# Patient Record
Sex: FEMALE | Race: WHITE | URBAN - METROPOLITAN AREA
[De-identification: names, ages, dates, MRNs, and addresses within clinical notes are randomized per-mention and may not be internally consistent; named-entity substitution may affect disease eponyms.]

---

## 2022-06-10 ENCOUNTER — OFFICE VISIT (OUTPATIENT)
Dept: URBAN - METROPOLITAN AREA CLINIC 56 | Facility: CLINIC | Age: 62
End: 2022-06-10
Payer: COMMERCIAL

## 2022-06-10 DIAGNOSIS — H40.013 OPEN ANGLE WITH BORDERLINE FINDINGS, LOW RISK, BILATERAL: ICD-10-CM

## 2022-06-10 DIAGNOSIS — H25.813 COMBINED FORMS OF AGE-RELATED CATARACT, BILATERAL: Primary | ICD-10-CM

## 2022-06-10 PROCEDURE — 99204 OFFICE O/P NEW MOD 45 MIN: CPT | Performed by: STUDENT IN AN ORGANIZED HEALTH CARE EDUCATION/TRAINING PROGRAM

## 2022-06-10 PROCEDURE — 92133 CPTRZD OPH DX IMG PST SGM ON: CPT | Performed by: STUDENT IN AN ORGANIZED HEALTH CARE EDUCATION/TRAINING PROGRAM

## 2022-06-10 PROCEDURE — 92134 CPTRZ OPH DX IMG PST SGM RTA: CPT | Performed by: STUDENT IN AN ORGANIZED HEALTH CARE EDUCATION/TRAINING PROGRAM

## 2022-06-10 ASSESSMENT — VISUAL ACUITY
OD: 20/20
OS: 20/25

## 2022-06-10 ASSESSMENT — INTRAOCULAR PRESSURE
OD: 14
OS: 15

## 2022-06-10 NOTE — IMPRESSION/PLAN
Impression: Combined forms of age-related cataract, bilateral: H25.813. Plan: visually significant with expected improvement in vision with phaco/IOL. Discussed r/b/a of procedure. All surgical options discussed, including LensX vs conventional, and multifocal IOLs. Patient accepts full time glasses after surgery if conventional is elected, and glasses for NEAR if LensX/toric is elected. Multifocal options also discussed as most glasses-independent option. All questions answered. Refer patient for cataract pre-op. First Eye: OD Target: Distance Dilation: Good Habitual spec wear: (-) h/o trauma (-) h/o tamsulosin

## 2022-06-10 NOTE — IMPRESSION/PLAN
Impression: Open angle with borderline findings, low risk, bilateral: H40.013. LRNFL(06/2022): full OU Plan: 2' to [facility] appearance. Good IOP OU. Baseline RNFL full OU. No treatment indicated at this time. Repeat RNFL 1 year

## 2022-09-16 ENCOUNTER — TESTING ONLY (OUTPATIENT)
Dept: URBAN - METROPOLITAN AREA CLINIC 56 | Facility: CLINIC | Age: 62
End: 2022-09-16
Payer: COMMERCIAL

## 2022-09-16 DIAGNOSIS — H25.813 COMBINED FORMS OF AGE-RELATED CATARACT, BILATERAL: Primary | ICD-10-CM

## 2022-09-16 DIAGNOSIS — Z01.818 ENCOUNTER FOR OTHER PREPROCEDURAL EXAMINATION: Primary | ICD-10-CM

## 2022-09-16 PROCEDURE — 92025 CPTRIZED CORNEAL TOPOGRAPHY: CPT | Performed by: OPHTHALMOLOGY

## 2022-09-16 PROCEDURE — 99203 OFFICE O/P NEW LOW 30 MIN: CPT | Performed by: PHYSICIAN ASSISTANT

## 2022-09-21 ENCOUNTER — PRE-OPERATIVE VISIT (OUTPATIENT)
Dept: URBAN - METROPOLITAN AREA CLINIC 44 | Facility: CLINIC | Age: 62
End: 2022-09-21
Payer: COMMERCIAL

## 2022-09-21 DIAGNOSIS — H52.223 REGULAR ASTIGMATISM, BILATERAL: ICD-10-CM

## 2022-09-21 DIAGNOSIS — H25.812 COMBINED FORMS OF AGE-RELATED CATARACT, LEFT EYE: ICD-10-CM

## 2022-09-21 DIAGNOSIS — H25.813 COMBINED FORMS OF AGE-RELATED CATARACT, BILATERAL: Primary | ICD-10-CM

## 2022-09-21 PROCEDURE — 99204 OFFICE O/P NEW MOD 45 MIN: CPT | Performed by: OPHTHALMOLOGY

## 2022-09-21 ASSESSMENT — PACHYMETRY
OD: 3.99
OD: 24.62
OS: 4.02
OS: 24.61

## 2022-09-21 NOTE — IMPRESSION/PLAN
Impression: Combined forms of age-related cataract, bilateral: H25.813. Plan: Discussed cataract diagnosis with the patient. Discussed and reviewed treatment options for cataracts. Surgical treatment is required for cataracts. Risks and benefits of surgical treatment were discussed and understood. Patient elects surgical treatment. Recommend surgery OU, OD first.  TORIC VIVITY LENS OD, VIVITY LENS OS, WITH ORA, OD AIM: PLANO,  OS AIM -0.75. PLAN LRI, REVIEWED JENNIFER AND OCT . Discussed there is no perfect lens, need for readers and possible need of glasses. Discussed limitations to vision post op due to [facility],  If first eye doing well, ok to proceed with second eye surgery. .. DEXYCU

## 2022-09-28 ENCOUNTER — SURGERY (OUTPATIENT)
Dept: URBAN - METROPOLITAN AREA SURGERY 19 | Facility: SURGERY | Age: 62
End: 2022-09-28
Payer: COMMERCIAL

## 2022-09-28 DIAGNOSIS — H52.223 REGULAR ASTIGMATISM, BILATERAL: ICD-10-CM

## 2022-09-28 DIAGNOSIS — H25.813 COMBINED FORMS OF AGE-RELATED CATARACT, BILATERAL: Primary | ICD-10-CM

## 2022-09-28 PROCEDURE — PR1FY PR1FY: CUSTOM | Performed by: OPHTHALMOLOGY

## 2022-09-28 PROCEDURE — 66984 XCAPSL CTRC RMVL W/O ECP: CPT | Performed by: OPHTHALMOLOGY

## 2022-09-29 ENCOUNTER — POST-OPERATIVE VISIT (OUTPATIENT)
Dept: URBAN - METROPOLITAN AREA CLINIC 56 | Facility: CLINIC | Age: 62
End: 2022-09-29

## 2022-09-29 DIAGNOSIS — Z48.810 ENCOUNTER FOR SURGICAL AFTERCARE FOLLOWING SURGERY ON A SENSE ORGAN: Primary | ICD-10-CM

## 2022-09-29 ASSESSMENT — INTRAOCULAR PRESSURE: OD: 18

## 2022-09-29 NOTE — IMPRESSION/PLAN
Impression: S/P Cataract Extraction by phacoemulsification with IOL placement; ORA OD - 1 Day. Encounter for surgical aftercare following surgery on a sense organ  Z48.810. Excellent post op course   Post operative instructions reviewed - Plan: good IOP. Restrictions discussed. Call with worsening pain or vision.

## 2022-10-05 ENCOUNTER — POST-OPERATIVE VISIT (OUTPATIENT)
Dept: URBAN - METROPOLITAN AREA CLINIC 56 | Facility: CLINIC | Age: 62
End: 2022-10-05

## 2022-10-05 DIAGNOSIS — Z48.810 ENCOUNTER FOR SURGICAL AFTERCARE FOLLOWING SURGERY ON A SENSE ORGAN: Primary | ICD-10-CM

## 2022-10-05 ASSESSMENT — INTRAOCULAR PRESSURE
OD: 12
OS: 13

## 2022-10-05 NOTE — IMPRESSION/PLAN
Impression: S/P Cataract Extraction by phacoemulisfication with IOL OD - 7 Days. Encounter for surgical aftercare following surgery on a sense organ  Z48.810. Plan: good IOP, no infection. Restrictions lifted. Call with worsening pain or vision. alignment off by 30 degrees but good VA and minimal refraction. Monitor. Return as scheduled. OK to proceed with phaco second eye.

## 2022-10-12 ENCOUNTER — SURGERY (OUTPATIENT)
Dept: URBAN - METROPOLITAN AREA SURGERY 19 | Facility: SURGERY | Age: 62
End: 2022-10-12
Payer: COMMERCIAL

## 2022-10-12 DIAGNOSIS — H25.812 COMBINED FORMS OF AGE-RELATED CATARACT, LEFT EYE: Primary | ICD-10-CM

## 2022-10-12 DIAGNOSIS — H52.223 REGULAR ASTIGMATISM, BILATERAL: ICD-10-CM

## 2022-10-12 PROCEDURE — PR1FY PR1FY: CUSTOM | Performed by: OPHTHALMOLOGY

## 2022-10-12 PROCEDURE — 66984 XCAPSL CTRC RMVL W/O ECP: CPT | Performed by: OPHTHALMOLOGY

## 2022-10-13 ENCOUNTER — POST-OPERATIVE VISIT (OUTPATIENT)
Dept: URBAN - METROPOLITAN AREA CLINIC 56 | Facility: CLINIC | Age: 62
End: 2022-10-13

## 2022-10-13 DIAGNOSIS — Z96.1 PRESENCE OF INTRAOCULAR LENS: Primary | ICD-10-CM

## 2022-10-13 ASSESSMENT — INTRAOCULAR PRESSURE: OS: 18

## 2022-11-03 ENCOUNTER — POST-OPERATIVE VISIT (OUTPATIENT)
Dept: URBAN - METROPOLITAN AREA CLINIC 56 | Facility: CLINIC | Age: 62
End: 2022-11-03
Payer: COMMERCIAL

## 2022-11-03 DIAGNOSIS — Z96.1 PRESENCE OF INTRAOCULAR LENS: Primary | ICD-10-CM

## 2022-11-03 PROCEDURE — 99024 POSTOP FOLLOW-UP VISIT: CPT

## 2022-11-03 ASSESSMENT — VISUAL ACUITY
OD: 20/20
OS: 20/20

## 2022-11-03 ASSESSMENT — INTRAOCULAR PRESSURE
OS: 13
OD: 14

## 2022-11-03 NOTE — IMPRESSION/PLAN
Impression: S/P Cataract Extraction by phacoemulsification with IOL placement; ORA; LRI (Limbal Relaxing Incision) OS - 22 Days. Presence of intraocular lens  Z96.1. Plan: Pt doing fair 1-month s/p CE sx. Visual outcome of right eye not where she expected it to be, lots of residual astigmatism. Advised pt to wait until 90 day honey to assess if any YAG/LASIK needs to be done. Pt ok to resume all normal activities. RTC 9 months for complete exam or sooner PRN if vision changes or pain develops.

## 2023-01-09 ENCOUNTER — OFFICE VISIT (OUTPATIENT)
Dept: URBAN - METROPOLITAN AREA CLINIC 56 | Facility: LOCATION | Age: 63
End: 2023-01-09
Payer: COMMERCIAL

## 2023-01-09 DIAGNOSIS — Z96.1 PRESENCE OF INTRAOCULAR LENS: ICD-10-CM

## 2023-01-09 DIAGNOSIS — H26.493 OTHER SECONDARY CATARACT, BILATERAL: Primary | ICD-10-CM

## 2023-01-09 PROCEDURE — 99214 OFFICE O/P EST MOD 30 MIN: CPT

## 2023-01-09 ASSESSMENT — INTRAOCULAR PRESSURE
OD: 14
OS: 13

## 2023-01-09 ASSESSMENT — KERATOMETRY
OS: 44.67
OD: 44.56

## 2023-01-09 ASSESSMENT — VISUAL ACUITY
OD: 20/20
OS: 20/25

## 2023-01-09 NOTE — IMPRESSION/PLAN
Impression: Other secondary cataract, bilateral: H26.493. Plan: Ed pt on clinical findings. DWP that opacified capsule is responsible for visual issues. RBAs discussed, patient questions answered.  Recommend YAG capsulotomy OU

## 2023-02-09 ENCOUNTER — SURGERY (OUTPATIENT)
Dept: URBAN - METROPOLITAN AREA SURGERY 5 | Facility: SURGERY | Age: 63
End: 2023-02-09
Payer: COMMERCIAL

## 2025-05-27 ENCOUNTER — OFFICE VISIT (OUTPATIENT)
Dept: URBAN - METROPOLITAN AREA CLINIC 56 | Facility: LOCATION | Age: 65
End: 2025-05-27
Payer: MEDICARE

## 2025-05-27 DIAGNOSIS — Z96.1 PRESENCE OF INTRAOCULAR LENS: ICD-10-CM

## 2025-05-27 DIAGNOSIS — H35.371 PUCKERING OF MACULA, RIGHT EYE: ICD-10-CM

## 2025-05-27 DIAGNOSIS — H43.813 VITREOUS DEGENERATION, BILATERAL: ICD-10-CM

## 2025-05-27 DIAGNOSIS — H52.4 PRESBYOPIA: ICD-10-CM

## 2025-05-27 DIAGNOSIS — H40.013 OPEN ANGLE WITH BORDERLINE FINDINGS, LOW RISK, BILATERAL: Primary | ICD-10-CM

## 2025-05-27 PROCEDURE — 92134 CPTRZ OPH DX IMG PST SGM RTA: CPT | Performed by: STUDENT IN AN ORGANIZED HEALTH CARE EDUCATION/TRAINING PROGRAM

## 2025-05-27 PROCEDURE — 92133 CPTRZD OPH DX IMG PST SGM ON: CPT | Performed by: STUDENT IN AN ORGANIZED HEALTH CARE EDUCATION/TRAINING PROGRAM

## 2025-05-27 PROCEDURE — 92014 COMPRE OPH EXAM EST PT 1/>: CPT | Performed by: STUDENT IN AN ORGANIZED HEALTH CARE EDUCATION/TRAINING PROGRAM

## 2025-05-27 ASSESSMENT — INTRAOCULAR PRESSURE
OS: 13
OD: 13

## 2025-05-27 ASSESSMENT — KERATOMETRY
OD: 44.81
OS: 44.32

## 2025-05-27 ASSESSMENT — VISUAL ACUITY
OD: 20/25
OS: 20/30